# Patient Record
Sex: FEMALE | Employment: FULL TIME | URBAN - METROPOLITAN AREA
[De-identification: names, ages, dates, MRNs, and addresses within clinical notes are randomized per-mention and may not be internally consistent; named-entity substitution may affect disease eponyms.]

---

## 2024-05-15 ENCOUNTER — OFFICE VISIT (OUTPATIENT)
Dept: ENDOCRINOLOGY | Age: 34
End: 2024-05-15
Payer: COMMERCIAL

## 2024-05-15 VITALS
BODY MASS INDEX: 35.1 KG/M2 | OXYGEN SATURATION: 98 % | WEIGHT: 218.4 LBS | HEIGHT: 66 IN | DIASTOLIC BLOOD PRESSURE: 89 MMHG | HEART RATE: 73 BPM | SYSTOLIC BLOOD PRESSURE: 139 MMHG | TEMPERATURE: 97.3 F

## 2024-05-15 DIAGNOSIS — E03.8 HYPOTHYROIDISM DUE TO HASHIMOTO'S THYROIDITIS: Primary | ICD-10-CM

## 2024-05-15 DIAGNOSIS — E06.3 HYPOTHYROIDISM DUE TO HASHIMOTO'S THYROIDITIS: Primary | ICD-10-CM

## 2024-05-15 DIAGNOSIS — E04.0 GOITER DIFFUSE, NONTOXIC: ICD-10-CM

## 2024-05-15 PROCEDURE — 99203 OFFICE O/P NEW LOW 30 MIN: CPT | Performed by: INTERNAL MEDICINE

## 2024-05-15 RX ORDER — LEVOTHYROXINE SODIUM 0.15 MG/1
150 TABLET ORAL DAILY
Qty: 90 TABLET | Refills: 1 | Status: SHIPPED | OUTPATIENT
Start: 2024-05-15

## 2024-05-15 ASSESSMENT — ENCOUNTER SYMPTOMS: HOARSE VOICE: 0

## 2024-05-15 NOTE — PROGRESS NOTES
5/15/2024    Assessment:       Diagnosis Orders   1. Hypothyroidism due to Hashimoto's thyroiditis        2. Goiter diffuse, nontoxic              PLAN:     Increase dose of levothyroxine 150 mcg daily  Repeat thyroid function test in 2 to 3 months  Repeat thyroid ultrasound in 6 to 12 months  Patient to talk to family doctor/GI regarding getting a HIDA scan for right upper quadrant abdominal pain possible gallbladder akinesia  More than 50% of 30 minutes spent in patient education counseling  Orders Placed This Encounter   Procedures    Basic Metabolic Panel     Standing Status:   Future     Standing Expiration Date:   5/15/2025    T4, Free     Standing Status:   Future     Standing Expiration Date:   5/15/2025    TSH     Standing Status:   Future     Standing Expiration Date:   5/15/2025    T4, Free     Standing Status:   Future     Standing Expiration Date:   5/15/2025    TSH     Standing Status:   Future     Standing Expiration Date:   5/15/2025     Orders Placed This Encounter   Medications    levothyroxine (SYNTHROID) 150 MCG tablet     Sig: Take 1 tablet by mouth daily     Dispense:  90 tablet     Refill:  1           Orders Placed This Encounter   Procedures    Basic Metabolic Panel     Standing Status:   Future     Standing Expiration Date:   5/15/2025    T4, Free     Standing Status:   Future     Standing Expiration Date:   5/15/2025    TSH     Standing Status:   Future     Standing Expiration Date:   5/15/2025     No orders of the defined types were placed in this encounter.    No follow-ups on file.  Subjective:     Chief Complaint   Patient presents with    Thyroid Problem    New Patient     Vitals:    05/15/24 1356   BP: 139/89   Site: Right Upper Arm   Position: Sitting   Cuff Size: Medium Adult   Pulse: 73   Temp: 97.3 °F (36.3 °C)   TempSrc: Temporal   SpO2: 98%   Weight: 99.1 kg (218 lb 6.4 oz)   Height: 1.676 m (5' 6\")     Wt Readings from Last 3 Encounters:   05/15/24 99.1 kg (218 lb 6.4 oz)

## 2024-08-13 LAB
T4 FREE: 1.1
TSH SERPL DL<=0.05 MIU/L-ACNC: 0.16 UIU/ML

## 2024-08-15 ENCOUNTER — OFFICE VISIT (OUTPATIENT)
Dept: ENDOCRINOLOGY | Age: 34
End: 2024-08-15
Payer: COMMERCIAL

## 2024-08-15 VITALS
DIASTOLIC BLOOD PRESSURE: 78 MMHG | WEIGHT: 217 LBS | HEART RATE: 71 BPM | BODY MASS INDEX: 34.87 KG/M2 | SYSTOLIC BLOOD PRESSURE: 127 MMHG | OXYGEN SATURATION: 99 % | HEIGHT: 66 IN

## 2024-08-15 DIAGNOSIS — E03.8 HYPOTHYROIDISM DUE TO HASHIMOTO'S THYROIDITIS: Primary | ICD-10-CM

## 2024-08-15 DIAGNOSIS — E04.0 GOITER DIFFUSE, NONTOXIC: ICD-10-CM

## 2024-08-15 DIAGNOSIS — E06.3 HYPOTHYROIDISM DUE TO HASHIMOTO'S THYROIDITIS: Primary | ICD-10-CM

## 2024-08-15 PROCEDURE — 99213 OFFICE O/P EST LOW 20 MIN: CPT | Performed by: INTERNAL MEDICINE

## 2024-08-15 RX ORDER — ALUMINUM ZIRCONIUM OCTACHLOROHYDREX GLY 16 G/100G
4 GEL TOPICAL
COMMUNITY
Start: 2024-07-18

## 2024-08-15 NOTE — PROGRESS NOTES
8/15/2024    Assessment:       Diagnosis Orders   1. Hypothyroidism due to Hashimoto's thyroiditis  T4, Free    TSH with Reflex    US HEAD NECK SOFT TISSUE THYROID      2. Goiter diffuse, nontoxic  US HEAD NECK SOFT TISSUE THYROID            PLAN:     Continue current dose of Synthroid 150 mcg daily repeat labs in 3 to 4 months will also get thyroid ultrasound prior to next visit  Orders Placed This Encounter   Procedures    US HEAD NECK SOFT TISSUE THYROID     [facility]      Standing Status:   Future     Standing Expiration Date:   8/15/2025    T4, Free     Standing Status:   Future     Standing Expiration Date:   8/15/2025    TSH with Reflex     Standing Status:   Future     Standing Expiration Date:   8/15/2025       Subjective:     Chief Complaint   Patient presents with    Hypothyroidism    Hashimoto's Thyroiditis    Goiter     Vitals:    08/15/24 1420   BP: 127/78   Pulse: 71   SpO2: 99%   Weight: 98.4 kg (217 lb)   Height: 1.676 m (5' 6\")     Wt Readings from Last 3 Encounters:   08/15/24 98.4 kg (217 lb)   05/15/24 99.1 kg (218 lb 6.4 oz)     BP Readings from Last 3 Encounters:   08/15/24 127/78   05/15/24 139/89     Follow-up on hypothyroidism history of goiter patient had labs done recently last time the dose was increased most current free T4 from orders that 30 Free T4 was 1.10 on the upper end of normal TSH was slightly suppressed at 0.16 normal range being 0.45-5.33 patient also has symptoms of IBS/diarrhea seeing GI taking Xifaxan does have more energy and less fatigue    Thyroid Problem  Presents for follow-up visit. Symptoms include fatigue. Patient reports no palpitations or tremors. The symptoms have been improving.     No past medical history on file.  No past surgical history on file.  Social History     Socioeconomic History    Marital status:      Spouse name: Not on file    Number of children: Not on file    Years of education: Not on file    Highest

## 2024-11-11 ENCOUNTER — APPOINTMENT (OUTPATIENT)
Dept: ENDOCRINOLOGY | Facility: CLINIC | Age: 34
End: 2024-11-11
Payer: COMMERCIAL

## 2024-11-19 DIAGNOSIS — E06.3 HYPOTHYROIDISM DUE TO HASHIMOTO'S THYROIDITIS: ICD-10-CM

## 2024-11-19 RX ORDER — LEVOTHYROXINE SODIUM 150 UG/1
150 TABLET ORAL DAILY
Qty: 90 TABLET | Refills: 1 | Status: SHIPPED | OUTPATIENT
Start: 2024-11-19

## 2024-12-19 ENCOUNTER — OFFICE VISIT (OUTPATIENT)
Dept: ENDOCRINOLOGY | Age: 34
End: 2024-12-19
Payer: COMMERCIAL

## 2024-12-19 VITALS
BODY MASS INDEX: 34.55 KG/M2 | DIASTOLIC BLOOD PRESSURE: 83 MMHG | OXYGEN SATURATION: 98 % | SYSTOLIC BLOOD PRESSURE: 127 MMHG | HEIGHT: 66 IN | WEIGHT: 215 LBS

## 2024-12-19 DIAGNOSIS — E06.3 HYPOTHYROIDISM DUE TO HASHIMOTO'S THYROIDITIS: Primary | ICD-10-CM

## 2024-12-19 PROCEDURE — 99213 OFFICE O/P EST LOW 20 MIN: CPT | Performed by: INTERNAL MEDICINE

## 2024-12-19 RX ORDER — LEVOTHYROXINE SODIUM 137 UG/1
137 TABLET ORAL DAILY
Qty: 90 TABLET | Refills: 3 | Status: SHIPPED | OUTPATIENT
Start: 2024-12-19

## 2024-12-19 NOTE — PROGRESS NOTES
12/19/2024    Assessment:       Diagnosis Orders   1. Hypothyroidism due to Hashimoto's thyroiditis              PLAN:     Orders Placed This Encounter   Procedures    T4, Free     Standing Status:   Future     Standing Expiration Date:   12/19/2025    TSH reflex to FT4     Standing Status:   Future     Standing Expiration Date:   12/19/2025     Orders Placed This Encounter   Medications    levothyroxine (SYNTHROID) 137 MCG tablet     Sig: Take 1 tablet by mouth daily     Dispense:  90 tablet     Refill:  3   Lower dose of levothyroxine to 137 mcg daily        No orders of the defined types were placed in this encounter.    No orders of the defined types were placed in this encounter.    No follow-ups on file.  Subjective:     Chief Complaint   Patient presents with    Hypothyroidism    Goiter     Test results 12-8-24 ultrasound, labs in media     Vitals:    12/19/24 1507   BP: 127/83   SpO2: 98%   Weight: 97.5 kg (215 lb)   Height: 1.676 m (5' 6\")     Wt Readings from Last 3 Encounters:   12/19/24 97.5 kg (215 lb)   08/15/24 98.4 kg (217 lb)   05/15/24 99.1 kg (218 lb 6.4 oz)     BP Readings from Last 3 Encounters:   12/19/24 127/83   08/15/24 127/78   05/15/24 139/89         Follow-up on hypothyroidism history of Hashimoto thyroiditis recent thyroid ultrasound was reviewed thyroid gland was slightly enlarged no discrete nodules thyroid function test stable currently on levothyroxine 150 mcg daily  Most current TSH was slightly suppressed at 0.03 Free T4 not available for review    Thyroid Problem  Presents for follow-up visit. Patient reports no cold intolerance, heat intolerance, palpitations or tremors. The symptoms have been stable.         US THYROID  Order: [id number]  Narrative      EXAM: Thyroid Ultrasound.    REASON FOR EXAM: Hashimoto's disease.    TECHNIQUE: Ultrasound of the thyroid and lower neck was performed, with color flow Doppler and spectral analysis.    COMPARISON: 9/23/2020    FINDINGS:  Right

## 2025-01-29 ENCOUNTER — OFFICE VISIT (OUTPATIENT)
Dept: GASTROENTEROLOGY | Facility: CLINIC | Age: 35
End: 2025-01-29
Payer: COMMERCIAL

## 2025-01-29 VITALS
HEART RATE: 75 BPM | HEIGHT: 66 IN | BODY MASS INDEX: 33.68 KG/M2 | SYSTOLIC BLOOD PRESSURE: 130 MMHG | TEMPERATURE: 98.2 F | DIASTOLIC BLOOD PRESSURE: 77 MMHG | WEIGHT: 209.6 LBS

## 2025-01-29 DIAGNOSIS — R14.0 ABDOMINAL DISTENSION (GASEOUS): ICD-10-CM

## 2025-01-29 DIAGNOSIS — R19.7 DIARRHEA, UNSPECIFIED: ICD-10-CM

## 2025-01-29 DIAGNOSIS — R10.9 UNSPECIFIED ABDOMINAL PAIN: ICD-10-CM

## 2025-01-29 NOTE — LETTER
January 30, 2025     Rommel Garcia MD  [address] [facility] [address]    Patient: Brenna Birch   YOB: 1990   Date of Visit: 1/29/2025       Dear Dr. Rommel Garcia MD:    Thank you for referring Brenna Birch to me for evaluation. The hydrogen breath test was negative for SIBO and methane.   If you have questions, please do not hesitate to call me.       Sincerely,     Alma Wetzel, APRN-CNP      CC: No Recipients  ______________________________________________________________________________________

## 2025-01-29 NOTE — PROGRESS NOTES
Hydrogen Breath Analysis Consultation Sheet    Referring Provider: Rommel Garcia MD  [address] [place] [address]    Indication: Rule out SIBO    Symptoms: Abdominal pain and diarrhea    Age: 34 y.o.  Weight: 209.6 lb  Substrate: Dextrose   Dose: 75 gram    Last Meal: Chicken , rice, chicken broth with last meal at 8:45 pm  Recent Antibiotics: none.  Non-smoker    RESULTS:   Time PPM (H2) APPM* (CH4) CO2 Correction   Baseline #1 0910 9 9 5.4 1.01   Baseline #2 0912 10 10 5.6 0.99   *Challenge Dose Sugar: 75 gram Dextrose at 0915  15' 0930 9 9 6.1 0.91   30' 0945 12 10 5.9 0.94   45' 1000 8 10 5.8 0.95   60' 1015 7 9 5.6 0.99   75' 1030 8 9 6.5 0.85   90' 1045 5 8 6.2 0.89   105' 1100 7 9 6.2 0.89   120'        135'        150'        165'        180'          Impression: Negative for SIBO and Methane

## 2025-03-13 ENCOUNTER — TELEPHONE (OUTPATIENT)
Dept: SURGERY | Facility: CLINIC | Age: 35
End: 2025-03-13
Payer: COMMERCIAL

## 2025-04-24 ENCOUNTER — OFFICE VISIT (OUTPATIENT)
Age: 35
End: 2025-04-24
Payer: COMMERCIAL

## 2025-04-24 VITALS
BODY MASS INDEX: 33.91 KG/M2 | WEIGHT: 211 LBS | HEART RATE: 64 BPM | DIASTOLIC BLOOD PRESSURE: 69 MMHG | HEIGHT: 66 IN | SYSTOLIC BLOOD PRESSURE: 111 MMHG

## 2025-04-24 DIAGNOSIS — E06.3 HYPOTHYROIDISM DUE TO HASHIMOTO'S THYROIDITIS: Primary | ICD-10-CM

## 2025-04-24 PROCEDURE — 99213 OFFICE O/P EST LOW 20 MIN: CPT | Performed by: INTERNAL MEDICINE

## 2025-04-24 RX ORDER — LEVOTHYROXINE SODIUM 125 UG/1
137 TABLET ORAL DAILY
Qty: 90 TABLET | Refills: 3 | Status: SHIPPED | OUTPATIENT
Start: 2025-04-24

## 2025-04-24 RX ORDER — DICYCLOMINE HYDROCHLORIDE 10 MG/1
CAPSULE ORAL
COMMUNITY
Start: 2025-04-02

## 2025-04-24 NOTE — PROGRESS NOTES
4/24/2025    Assessment:       Diagnosis Orders   1. Hypothyroidism due to Hashimoto's thyroiditis              PLAN:     Orders Placed This Encounter   Procedures    T4, Free     Standing Status:   Future     Expected Date:   4/24/2025     Expiration Date:   4/24/2026    TSH reflex to FT4     Standing Status:   Future     Expected Date:   4/24/2025     Expiration Date:   4/24/2026     Orders Placed This Encounter   Medications    levothyroxine (SYNTHROID) 125 MCG tablet     Sig: Take 1 tablet by mouth daily     Dispense:  90 tablet     Refill:  3     Lower dose of levothyroxine to 125 mcg daily patient to follow-up in 3 to 6 months  Subjective:     Chief Complaint   Patient presents with    Hypothyroidism     Labs in media    Hashimoto's Thyroiditis     Vitals:    04/24/25 1508   BP: 111/69   Pulse: 64   Weight: 95.7 kg (211 lb)   Height: 1.676 m (5' 5.98\")     Wt Readings from Last 3 Encounters:   04/24/25 95.7 kg (211 lb)   12/19/24 97.5 kg (215 lb)   08/15/24 98.4 kg (217 lb)     BP Readings from Last 3 Encounters:   04/24/25 111/69   12/19/24 127/83   08/15/24 127/78     Follow-up on hypothyroidism Hashimoto thyroiditis patient had lab work done February 2, 2025 TSH was suppressed at 0.136 Free T4 was 1.44 patient currently on 137 mcg daily levothyroxine    Thyroid Problem  Presents for follow-up visit. Patient reports no cold intolerance, heat intolerance, palpitations or tremors. The symptoms have been worsening.     No past medical history on file.  No past surgical history on file.  Social History     Socioeconomic History    Marital status:      Spouse name: Not on file    Number of children: Not on file    Years of education: Not on file    Highest education level: Not on file   Occupational History    Not on file   Tobacco Use    Smoking status: Never     Passive exposure: Never    Smokeless tobacco: Never   Vaping Use    Vaping status: Never Used   Substance and Sexual Activity    Alcohol use: